# Patient Record
Sex: MALE | Race: WHITE | NOT HISPANIC OR LATINO | Employment: UNEMPLOYED | URBAN - METROPOLITAN AREA
[De-identification: names, ages, dates, MRNs, and addresses within clinical notes are randomized per-mention and may not be internally consistent; named-entity substitution may affect disease eponyms.]

---

## 2022-08-29 ENCOUNTER — HOSPITAL ENCOUNTER (EMERGENCY)
Facility: CLINIC | Age: 62
Discharge: HOME | End: 2022-08-29
Attending: EMERGENCY MEDICINE | Admitting: EMERGENCY MEDICINE
Payer: MEDICARE

## 2022-08-29 VITALS
OXYGEN SATURATION: 96 % | SYSTOLIC BLOOD PRESSURE: 115 MMHG | TEMPERATURE: 98.3 F | WEIGHT: 183 LBS | BODY MASS INDEX: 26.2 KG/M2 | RESPIRATION RATE: 16 BRPM | DIASTOLIC BLOOD PRESSURE: 74 MMHG | HEART RATE: 76 BPM | HEIGHT: 70 IN

## 2022-08-29 DIAGNOSIS — R44.3 HALLUCINATIONS: ICD-10-CM

## 2022-08-29 DIAGNOSIS — Z91.148 NONCOMPLIANCE WITH MEDICATION REGIMEN: ICD-10-CM

## 2022-08-29 DIAGNOSIS — F22 DELUSION (H): ICD-10-CM

## 2022-08-29 LAB — SARS-COV-2 RNA RESP QL NAA+PROBE: NEGATIVE

## 2022-08-29 PROCEDURE — U0005 INFEC AGEN DETEC AMPLI PROBE: HCPCS | Performed by: EMERGENCY MEDICINE

## 2022-08-29 PROCEDURE — 99203 OFFICE O/P NEW LOW 30 MIN: CPT | Performed by: PSYCHIATRY & NEUROLOGY

## 2022-08-29 PROCEDURE — 99285 EMERGENCY DEPT VISIT HI MDM: CPT | Mod: CS,25

## 2022-08-29 PROCEDURE — 90791 PSYCH DIAGNOSTIC EVALUATION: CPT

## 2022-08-29 PROCEDURE — C9803 HOPD COVID-19 SPEC COLLECT: HCPCS

## 2022-08-29 PROCEDURE — 250N000013 HC RX MED GY IP 250 OP 250 PS 637: Performed by: EMERGENCY MEDICINE

## 2022-08-29 RX ORDER — OLANZAPINE 5 MG/1
5 TABLET, ORALLY DISINTEGRATING ORAL ONCE
Status: COMPLETED | OUTPATIENT
Start: 2022-08-29 | End: 2022-08-29

## 2022-08-29 RX ADMIN — OLANZAPINE 5 MG: 5 TABLET, ORALLY DISINTEGRATING ORAL at 08:27

## 2022-08-29 ASSESSMENT — ENCOUNTER SYMPTOMS
APPETITE CHANGE: 1
HALLUCINATIONS: 1
SLEEP DISTURBANCE: 1

## 2022-08-29 ASSESSMENT — ACTIVITIES OF DAILY LIVING (ADL)
ADLS_ACUITY_SCORE: 35

## 2022-08-29 NOTE — DISCHARGE INSTRUCTIONS
Aftercare Plan    Follow up with established providers and supports as scheduled. Continue taking medications as prescribed. Abstain from drugs and alcohol. Utilize your [facility] mental health crisis team as needed. They are available 24/7. Contact information is listed below.       If I am feeling unsafe or I am in a crisis, I will:   Contact my established care providers   Call the [place] Suicide Prevention Lifeline: [phone]   Go to the nearest emergency room   Call 911     Warning signs that I or other people might notice when a crisis is developing for me: changes to sleep, appetite or mood, increased anger, agitation or irritability, feeling depressed or hopeless, spending more time alone or talking less, increased crying, decreased productivity, seeing or hearing things that aren't there, thoughts of not wanting to live anymore or of actually killing myself, thoughts of hurting others    Things I am able to do on my own to cope or help me feel better: watching a favorite tv show or movie, listening to music I enjoy, going outside and breathing fresh air, going for a walk or exercising, taking a shower or bath, a cold or hot beverage, a healthy snack, drawing/coloring/painting, journaling, singing or dancing, deep breathing     I can try practicing square breathing when I begin to feel anxious - inhale through the nose for the count of 4 and the first line on the square. Exhale through the mouth for the count of 4 for the second line of the square. Repeat to complete the square. Repeat the square as many times as needed.    I can also use my five senses to practice mindfulness and grounding. What are five things I can see, four things I can hear, three things I can feel, two things I can smell, and one thing I can taste.     Things that I am able to do with others to cope or help me feel better: sometimes just talking or spending time with someone else, sharing a meal or having coffee, watching a movie or  "playing a game, going for a walk or exercising    I can also use community resources including mental health hotlines, [facility] crisis teams, or apps.     Things I can use or do for distraction: movies/tv, music, reading, games, drawing/coloring/painting or other art, essential oils, exercise, cleaning/organizing, puzzles, crossword puzzles, word search, Sudoku       I can also download a meditation or relaxation noah, like Calm, Headspace, or Insight Timer (all three offer a free version)    Changes I can make to support my mental health and wellness: Attend scheduled mental health therapy and psychiatric appointments. Take my medications as prescribed. Maintain a daily schedule/routine. Abstain from all mood altering substances, including drugs, alcohol, or medications not currently prescribed to me. Implement a self-care routine.      People in my life that I can ask for help: friends or family, trusted teachers/staff/colleagues, trusted members of my community or place of Christian, mental health crisis lines, or 911    Your [facility] has a mental health crisis team you can call 24/7: [facility], [phone]    Other things that are important when I m in crisis: to remember that the feelings I am having right now are temporary, and it won't feel like this forever, and that it is okay and important to ask for help    Crisis Lines  Crisis Text Line  Text 801978  You will be connected with a trained live crisis counselor to provide support.    Por espanol, texto  JAY a 447397 o texto a 442-AYUDAME en WhatsApp    [place] Hope Line  1.800.SUICIDE [3848979]      Community Resources  Fast Tracker  Linking people to mental health and substance use disorder resources  fasttrackermn.org     [place] Mental Health Warm Line  Peer to peer support  Monday thru Saturday, 12 pm to 10 pm  035.754.3787 or 9.423.208.2667  Text \"Support\" to 10082    [employer] (ROSALIA)  870.658.0819 or " 1.888.ROSALIA.HELPS      Mental Health Apps  My3  https://myUkashpp.org/    VirtualHopeBox  https://H3 PolÃ[employer]meros/apps/virtual-hope-box/      Additional Information  Today you were seen by a licensed mental health professional through Triage and Transition services, [facility])  for a crisis assessment in the Emergency Department at [facility].  It is recommended that you follow up with your established providers (psychiatrist, mental health therapist, and/or primary care doctor - as relevant) as soon as possible. Coordinators from [facility] will be calling you in the next 24-48 hours to ensure that you have the resources you need.  You can also contact [facility] coordinators directly at [phone]. You may have been scheduled for or offered an appointment with a mental health provider. [facility] maintains an extensive network of licensed behavioral health providers to connect patients with the services they need.  We do not charge providers a fee to participate in our referral network.  We match patients with providers based on a patient's specific needs, insurance coverage, and location.  Our first effort will be to refer you to a provider within your care system, and will utilize providers outside your care system as needed.

## 2022-08-29 NOTE — PLAN OF CARE
Admission Note:     Writer met with the patient for admission interview. Patient affect calm. Pt denies suicidal ideation. Pt forthcoming with reported delusions. Pt states concerned there are people wanting to get into his home. Pt reported contacted the police. Pt states police brought him to hospital due to mental illness. Pt denies having a mental illness. Pt reported is not taking any medications for mental illness. Pt denies alcohol and drug use. Patient changed into scrubs, belongings placed in the locker. Pt acceptance of transfer to [facility].

## 2022-08-29 NOTE — ED PROVIDER NOTES
"  History   Chief Complaint:  Mental Health Problem       The history is provided by the patient and the police.      Joel Kruse is a 62 year old male with history of HIV (undetectable viral load May 2022) and depression who presents with hallucinations. Per police report, the patient has been calling PD due to hearing voices in his walls. He believes people are breaking into his living space and bugging the walls. He is not taking any medications because he did not feel those prescribed by a psychiatrist about a year ago (Seroquel and mirtazapine) were necessary or helpful.     Joel reports he is being harassed by his neighbors and has to keep calling the police as a result. \"Herman\" is who is orchestrating the people that are monitoring him and trying to kill him. Today he saw shadows under his door consistent with someone breaking in to his house so he called PD. There was no one in his house when they arrived so he thinks they left out the sliding glass door. He has poor appetite and severe insomnia. He denies drug use. He denies suicidal ideation or homicidal ideation.    Review of Systems   Constitutional: Positive for appetite change.   Psychiatric/Behavioral: Positive for hallucinations and sleep disturbance. Negative for suicidal ideas.         NEG - homicidal ideation   All other systems reviewed and are negative.    Allergies:  No Known Allergies    Medications:  Aspirin 81 mg   Biktarvy   Cialis     Past Medical History:     ADHD   Hyperlipidemia   Peripheral neuropathy   Depression   UTI  Tobacco use disorder   HIV     Past Surgical History:    Colonoscopy (x2)  Eye surgery     Social History:  The patient presents to the ED alone.   Lives alone.  Does not smoke, use alcohol, or use drugs.   Does not work due to disability (HIV).     Physical Exam     Patient Vitals for the past 24 hrs:   BP Temp Temp src Pulse Resp SpO2 Height Weight   08/29/22 0830 115/74 98.3  F (36.8  C) Oral 76 -- 96 % " "1.778 m (5' 10\") 83 kg (183 lb)   08/29/22 0824 (!) 157/103 -- -- 114 -- 97 % -- --   08/29/22 0703 (!) 151/97 98.7  F (37.1  C) Oral 115 16 95 % -- --     Physical Exam   General: WD/WN; well appearing middle aged  man; cooperative  Head:  Atraumatic  Eyes:  Conjunctivae, lids, and sclerae are normal  Neck:  Supple; normal ROM  Resp:  No respiratory distress  GI:  Nondistended    MS:  Normal ROM  Skin:  Warm; non-diaphoretic; no pallor  Neuro: Awake; A&Ox3  Psych:  Anxious mood and affect; normal speech; not responding to internal stimuli. Delusional thought content without suicidal thought content  Vitals reviewed.    Emergency Department Course     Laboratory:  Labs Ordered and Resulted from Time of ED Arrival to Time of ED Departure   COVID-19 VIRUS (CORONAVIRUS) BY PCR - Normal       Result Value    SARS CoV2 PCR Negative          Emergency Department Course:  Reviewed:  I reviewed nursing notes, vitals, past medical history, and Care Everywhere.    Assessments:  ED Course as of 08/29/22 0848   Mon Aug 29, 2022   0740 I obtained history and examined the patient.      Disposition:  The patient was transferred to [facility].     Impression & Plan   Medical Decision Making:  Joel is a 62 year old man who called police several times today because he was certain people were trying to break into his apartment and had been bugging his walls and watching him.  He tells me a man named \"Herman\" is orchestrating people to monitor him and try to kill him for quite some time.  He describes anorexia and insomnia.  He denies drug use.  He reports about a year ago he was prescribed psychotropics but he did not take these because he did not feel they were necessary and does not have any mental health providers.  On exam he appears well, anxious, with no suicidal thought content.  However, he clearly has delusional thought content and probable hallucinations although he is not responding to internal stimuli during " interview.  I have concern for schizophrenia versus bipolar suzanne with psychosis.  After his COVID-19 test was negative he was transferred to the [facility] unit for a thorough psychiatric evaluation and treatment plan.  Joel understands this plan and all of his questions were answered.    Covid-19  Joel Kruse was evaluated during a global COVID-19 pandemic, which necessitated consideration that the patient might be at risk for infection with the SARS-CoV-2 virus that causes COVID-19.   Applicable protocols for evaluation were followed during the patient's care.   COVID-19 was considered as part of the patient's evaluation. The plan for testing is:  a test was obtained during this visit.    Diagnosis:    ICD-10-CM    1. Delusion (H)  F22    2. Hallucinations  R44.3    3. Noncompliance with medication regimen  Z91.14        Scribe Disclosure:  GIN SULTANA, am serving as a scribe at 7:01 AM on 8/29/2022 to document services personally performed by Dr. Nita Fields MD, based on my observations and the provider's statements to me         Nita Fields MD  09/04/22 2276

## 2022-08-29 NOTE — ED TRIAGE NOTES
Pt arrives by EMS. PD were called by Joel 3 separate times for him reporting people breaking into his house. Joel has not been sleeping and believes that his house is being bugged/recorded by unknown people. Joel admits to not talking his mental health medications.       Triage Assessment     Row Name 08/29/22 0646       Triage Assessment (Adult)    Airway WDL WDL       Respiratory WDL    Respiratory WDL WDL       Skin Circulation/Temperature WDL    Skin Circulation/Temperature WDL WDL       Cardiac WDL    Cardiac WDL WDL       Peripheral/Neurovascular WDL    Peripheral Neurovascular WDL WDL       Cognitive/Neuro/Behavioral WDL    Cognitive/Neuro/Behavioral WDL X;mood/behavior       [place] Coma Scale    Best Eye Response 4-->(E4) spontaneous    Best Motor Response 6-->(M6) obeys commands    Best Verbal Response 5-->(V5) oriented    [place] Coma Scale Score 15
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
## 2022-08-29 NOTE — ED PROVIDER NOTES
EmPATH Unit - Psychiatry  Combined Observation Note and Discharge Summary  [facility] Emergency Department  Observation Initiation Date: Aug 29, 2022    Joel Kruse MRN: [id number]   Age: 62 year old YOB: 1960     History     Chief Complaint   Patient presents with     Mental Health Problem     HPI  Joel Kruse is a 62 year old male with a past history notable for methamphetamine use which has been in remission for some time.  He presented to the emergency department for evaluation of paranoia and hallucinations.  He was determined to be medically stable and transferred to the EmPATH unit for psychiatric assessment.  Since his arrival to the unit, there have been no acute issues.  On examination, the patient was noted to be sleeping comfortably in the recliner.  He awoke easily and accompany me to the interview room.  He reports that his anxiety has subsided since his arrival to the unit.  He has not been experiencing paranoia or hallucinations since his arrival.  He explains that increased psychosocial stressors related to an [place] back home to [place], further compounded with a few nights of insomnia, may have contributed to the symptoms which preceded his arrival.  He has been sleeping well on the unit and attributes this as having a therapeutic effect.  He recalls a history of taking Seroquel and mirtazapine for insomnia however discontinued these medications sometime ago.  He denied symptoms of a depressive episode.  He denied symptoms of suzanne.  He denied active psychosis.  He denied suicidal and homicidal thoughts.  He did not report recent illicit substance usage.  He interprets readiness to discharge home today.      Past Medical History  Past Medical History:   Diagnosis Date     Asymptomatic human immunodeficiency virus (HIV) infection status (H)      Coma (H) 2012    Pt unsure why     Gastro-oesophageal reflux disease   "    Past Surgical History:   Procedure Laterality Date     COLONOSCOPY N/A 10/23/2014    Procedure: COMBINED COLONOSCOPY, SINGLE OR MULTIPLE BIOPSY/POLYPECTOMY BY BIOPSY;  Surgeon: Hamilton Horton MD;  Location:  GI     COLONOSCOPY N/A 11/13/2015    Procedure: COMBINED COLONOSCOPY, SINGLE OR MULTIPLE BIOPSY/POLYPECTOMY BY BIOPSY;  Surgeon: Hamilton Horton MD;  Location:  GI     EYE SURGERY       Abacavir Sulfate-Lamivudine (EPZICOM PO)  AMIODARONE HCL PO  Atorvastatin Calcium (LIPITOR PO)  Esomeprazole Magnesium (NEXIUM PO)  GABAPENTIN PO  NALTREXONE HCL PO  raltegravir (ISENTRESS) 400 MG tablet  Varenicline Tartrate (CHANTIX PO)      No Known Allergies  Family History  No family history on file.  Social History   Social History     Tobacco Use     Smoking status: Former Smoker     Packs/day: 0.25     Years: 3.00     Pack years: 0.75   Substance Use Topics     Alcohol use: No     Comment: Quit in 2012     Drug use: No     Comment: Quit in 2012      Past medical history, past surgical history, medications, allergies, family history, and social history were reviewed with the patient. No additional pertinent items.       Review of Systems  A complete review of systems was performed with pertinent positives and negatives noted in the HPI, and all other systems negative.    Physical Examination   BP: (!) 151/97  Pulse: 115  Temp: 98.7  F (37.1  C)  Resp: 16  Height: 177.8 cm (5' 10\")  Weight: 83 kg (183 lb)  SpO2: 95 %    Physical Exam  General: Appears stated age.   Neuro: Alert and fully oriented. Extremities appear to demonstrate normal strength on visual inspection.   Integumentary/Skin: no rash visualized, normal color    Psychiatric Examination   Appearance: awake, alert  Attitude:  cooperative  Eye Contact:  fair  Mood:  better  Affect:  appropriate and in normal range  Speech:  clear, coherent  Psychomotor Behavior:  no evidence of tardive dyskinesia, dystonia, or tics  Thought Process:  logical and " linear  Associations:  no loose associations  Thought Content:  no evidence of suicidal ideation or homicidal ideation and no evidence of psychotic thought  Insight:  fair  Judgement:  intact  Oriented to:  time, person, and place  Attention Span and Concentration:  fair  Recent and Remote Memory:  fair  Language: able to name/identify objects without impairment  Fund of Knowledge: intact with awareness of current and past events    ED Course     ED Course as of 08/29/22 1244   Mon Aug 29, 2022   0740 I obtained history and examined the patient.        Labs Ordered and Resulted from Time of ED Arrival to Time of ED Departure   COVID-19 VIRUS (CORONAVIRUS) BY PCR - Normal       Result Value    SARS CoV2 PCR Negative         Assessments & Plan (with Medical Decision Making)   Patient presenting with concern for paranoid ideations and associated hallucinations. Nursing notes reviewed noting no acute issues.  Symptoms have subsided since his arrival to the unit.  The patient reports regaining baseline functioning and interprets readiness to discharge home.    I have reviewed the assessment completed by the [facility].     During the observation period, the patient did not require medications for agitation, and did not require restraints/seclusion for patient and/or provider safety.    The patient was found to have a psychiatric condition that would benefit from an observation stay in the emergency department for further psychiatric stabilization and/or coordination of a safe disposition. The observation plan includes serial assessments of psychiatric condition, potential administration of medications if indicated, further disposition pending the patient's psychiatric course during the monitoring period.     Preliminary diagnosis:    ICD-10-CM    1. Delusion (H)  F22    2. Hallucinations  R44.3         Treatment Plan:  -Urine toxicology screen has been ordered to rule out the possibility of illicit substances contributing to  his presentation.  -At this time, there does not appear to be any indication to initiate psychotropic medications.  The patient is not seeking any medications for insomnia or other mental health related concerns.  -Resume routine outpatient visits with his primary care physician or sooner if indicated  -Discharge home today.    After the period in observation care, the patient's circumstances and mental state were safe for outpatient management. After counseling on the diagnosis, work-up, and treatment plan, the patient was discharged. Close follow-up with a psychiatrist and/or therapist was recommended and community psychiatric resources were provided. Patient is to return to the ED if any urgent or potentially life-threatening concerns.      At the time of discharge, the patient's acute suicide risk was determined to be low due to the following factors: Reduction in the intensity of mood/anxiety symptoms that preceded the admission, denial of suicidal thoughts, denies feeling helpless or helpless, not currently under the influence of alcohol or illicit substances, denies experiencing command hallucinations, no immediate access to firearms. The patient's acute risk could be higher if noncompliant with their treatment plan, medications, follow-up appointments or using illicit substances or alcohol. Protective factors include: social supports, stable housing, employment    --  Alan Nunez MD   [facility] EMERGENCY DEPT  EmPATH Unit  8/29/2022         Alan Nunez MD  08/29/22 4800

## 2022-08-29 NOTE — PLAN OF CARE
Discharge instructions reviewed with patient including follow-up care plan. Medications:NONE. Reviewed safety plan and outpatient resources. Denies SI and HI. All belongings that were brought into the hospital have been returned to patient. Escorted off the unit at 13:25 accompanied by [facility] staff. Discharged to Home via Bus.

## 2022-08-29 NOTE — ED NOTES
Bed: ED17  Expected date: 8/29/22  Expected time: 6:35 AM  Means of arrival: Ambulance  Comments:  Shonna 511 62M hallucinating; off meds

## 2022-09-07 ENCOUNTER — APPOINTMENT (OUTPATIENT)
Dept: GENERAL RADIOLOGY | Facility: CLINIC | Age: 62
End: 2022-09-07
Attending: EMERGENCY MEDICINE
Payer: MEDICARE

## 2022-09-07 ENCOUNTER — APPOINTMENT (OUTPATIENT)
Dept: CT IMAGING | Facility: CLINIC | Age: 62
End: 2022-09-07
Attending: EMERGENCY MEDICINE
Payer: MEDICARE

## 2022-09-07 ENCOUNTER — HOSPITAL ENCOUNTER (EMERGENCY)
Facility: CLINIC | Age: 62
Discharge: HOME | End: 2022-09-07
Attending: EMERGENCY MEDICINE | Admitting: EMERGENCY MEDICINE
Payer: MEDICARE

## 2022-09-07 VITALS
WEIGHT: 185 LBS | BODY MASS INDEX: 25.06 KG/M2 | OXYGEN SATURATION: 99 % | HEIGHT: 72 IN | DIASTOLIC BLOOD PRESSURE: 82 MMHG | SYSTOLIC BLOOD PRESSURE: 115 MMHG | HEART RATE: 76 BPM | TEMPERATURE: 97.2 F | RESPIRATION RATE: 14 BRPM

## 2022-09-07 DIAGNOSIS — R06.02 SHORTNESS OF BREATH: ICD-10-CM

## 2022-09-07 LAB
ALBUMIN SERPL-MCNC: 3.8 G/DL (ref 3.4–5)
ALP SERPL-CCNC: 92 U/L (ref 40–150)
ALT SERPL W P-5'-P-CCNC: 25 U/L (ref 0–70)
ANION GAP SERPL CALCULATED.3IONS-SCNC: 4 MMOL/L (ref 3–14)
AST SERPL W P-5'-P-CCNC: 18 U/L (ref 0–45)
BASOPHILS # BLD AUTO: 0.1 10E3/UL (ref 0–0.2)
BASOPHILS NFR BLD AUTO: 1 %
BILIRUB SERPL-MCNC: 0.3 MG/DL (ref 0.2–1.3)
BUN SERPL-MCNC: 18 MG/DL (ref 7–30)
CALCIUM SERPL-MCNC: 9.4 MG/DL (ref 8.5–10.1)
CHLORIDE BLD-SCNC: 102 MMOL/L (ref 94–109)
CO2 SERPL-SCNC: 30 MMOL/L (ref 20–32)
CREAT SERPL-MCNC: 1.46 MG/DL (ref 0.66–1.25)
D DIMER PPP FEU-MCNC: 1.26 UG/ML FEU (ref 0–0.5)
EOSINOPHIL # BLD AUTO: 0.6 10E3/UL (ref 0–0.7)
EOSINOPHIL NFR BLD AUTO: 8 %
ERYTHROCYTE [DISTWIDTH] IN BLOOD BY AUTOMATED COUNT: 14.6 % (ref 10–15)
FLUAV RNA SPEC QL NAA+PROBE: NEGATIVE
FLUBV RNA RESP QL NAA+PROBE: NEGATIVE
GFR SERPL CREATININE-BSD FRML MDRD: 54 ML/MIN/1.73M2
GLUCOSE BLD-MCNC: 102 MG/DL (ref 70–99)
HCT VFR BLD AUTO: 44.7 % (ref 40–53)
HGB BLD-MCNC: 13.6 G/DL (ref 13.3–17.7)
HOLD SPECIMEN: NORMAL
IMM GRANULOCYTES # BLD: 0 10E3/UL
IMM GRANULOCYTES NFR BLD: 0 %
LYMPHOCYTES # BLD AUTO: 1.6 10E3/UL (ref 0.8–5.3)
LYMPHOCYTES NFR BLD AUTO: 19 %
MCH RBC QN AUTO: 25.1 PG (ref 26.5–33)
MCHC RBC AUTO-ENTMCNC: 30.4 G/DL (ref 31.5–36.5)
MCV RBC AUTO: 83 FL (ref 78–100)
MONOCYTES # BLD AUTO: 0.4 10E3/UL (ref 0–1.3)
MONOCYTES NFR BLD AUTO: 5 %
NEUTROPHILS # BLD AUTO: 5.6 10E3/UL (ref 1.6–8.3)
NEUTROPHILS NFR BLD AUTO: 67 %
NRBC # BLD AUTO: 0 10E3/UL
NRBC BLD AUTO-RTO: 0 /100
NT-PROBNP SERPL-MCNC: 27 PG/ML (ref 0–900)
PLATELET # BLD AUTO: 439 10E3/UL (ref 150–450)
POTASSIUM BLD-SCNC: 4.3 MMOL/L (ref 3.4–5.3)
PROT SERPL-MCNC: 9.2 G/DL (ref 6.8–8.8)
RBC # BLD AUTO: 5.41 10E6/UL (ref 4.4–5.9)
RSV RNA SPEC NAA+PROBE: NEGATIVE
SARS-COV-2 RNA RESP QL NAA+PROBE: NEGATIVE
SODIUM SERPL-SCNC: 136 MMOL/L (ref 133–144)
TROPONIN I SERPL HS-MCNC: 5 NG/L
WBC # BLD AUTO: 8.3 10E3/UL (ref 4–11)

## 2022-09-07 PROCEDURE — 85379 FIBRIN DEGRADATION QUANT: CPT | Performed by: EMERGENCY MEDICINE

## 2022-09-07 PROCEDURE — 80053 COMPREHEN METABOLIC PANEL: CPT | Performed by: EMERGENCY MEDICINE

## 2022-09-07 PROCEDURE — 36415 COLL VENOUS BLD VENIPUNCTURE: CPT | Performed by: EMERGENCY MEDICINE

## 2022-09-07 PROCEDURE — 93005 ELECTROCARDIOGRAM TRACING: CPT

## 2022-09-07 PROCEDURE — 87637 SARSCOV2&INF A&B&RSV AMP PRB: CPT | Performed by: EMERGENCY MEDICINE

## 2022-09-07 PROCEDURE — 85004 AUTOMATED DIFF WBC COUNT: CPT | Performed by: EMERGENCY MEDICINE

## 2022-09-07 PROCEDURE — 83880 ASSAY OF NATRIURETIC PEPTIDE: CPT | Performed by: EMERGENCY MEDICINE

## 2022-09-07 PROCEDURE — 84484 ASSAY OF TROPONIN QUANT: CPT | Performed by: EMERGENCY MEDICINE

## 2022-09-07 PROCEDURE — G1010 CDSM STANSON: HCPCS

## 2022-09-07 PROCEDURE — 99285 EMERGENCY DEPT VISIT HI MDM: CPT | Mod: 25

## 2022-09-07 PROCEDURE — C9803 HOPD COVID-19 SPEC COLLECT: HCPCS

## 2022-09-07 PROCEDURE — 250N000009 HC RX 250: Performed by: EMERGENCY MEDICINE

## 2022-09-07 PROCEDURE — 82040 ASSAY OF SERUM ALBUMIN: CPT | Performed by: EMERGENCY MEDICINE

## 2022-09-07 PROCEDURE — 250N000011 HC RX IP 250 OP 636: Performed by: EMERGENCY MEDICINE

## 2022-09-07 PROCEDURE — 71046 X-RAY EXAM CHEST 2 VIEWS: CPT

## 2022-09-07 RX ORDER — IOPAMIDOL 755 MG/ML
69 INJECTION, SOLUTION INTRAVASCULAR ONCE
Status: COMPLETED | OUTPATIENT
Start: 2022-09-07 | End: 2022-09-07

## 2022-09-07 RX ADMIN — SODIUM CHLORIDE 93 ML: 9 INJECTION, SOLUTION INTRAVENOUS at 18:52

## 2022-09-07 RX ADMIN — IOPAMIDOL 69 ML: 755 INJECTION, SOLUTION INTRAVENOUS at 18:51

## 2022-09-07 ASSESSMENT — ACTIVITIES OF DAILY LIVING (ADL)
ADLS_ACUITY_SCORE: 35
ADLS_ACUITY_SCORE: 35

## 2022-09-07 ASSESSMENT — ENCOUNTER SYMPTOMS
DIAPHORESIS: 1
SHORTNESS OF BREATH: 1
COUGH: 1
LIGHT-HEADEDNESS: 1

## 2022-09-07 NOTE — ED NOTES
Rapid Assessment Note    History:   Joel Kruse is a 62 year old male who presents with lightheadedness. He has had a dry cough for the past couple of weeks, but it worsened this morning. Then later in the day, he was walking over to the [employer] while it was hot outside. Just before he walked into the mall, he suddenly became diaphoretic, lightheaded, and short of breath. He does not normally get short of breath easily. His symptoms prompted him to sit down. He continued to walk throughout the mall, and had another episode of symptoms once he reached the other side. This prompted him to call 911, and he was initially assessed by EMS. He continues to have diaphoresis since then. He does not have a history of heart disease, blood clots, or cancers. His vaccinated for Covid-19, and has not had it recently. He is not aware of any sick contacts, but had a flight back from [facility] a couple weeks ago. He is not a smoker. He denies chest pain or leg swelling/pain.     Exam:   General:  Alert, interactive  Cardiovascular:  Well perfused  Lungs:  No respiratory distress, no accessory muscle use  Neuro:  Moving all 4 extremities  Skin:  Warm, dry  Psych:  Normal affect    Plan of Care:   I evaluated the patient and developed an initial plan of care. I discussed this plan and explained that I, or one of my partners, would be returning to complete the evaluation.     I, Carter Murdock, am serving as a scribe to document services personally performed by Zeina Drummond MD, based on my observations and the provider's statements to me. I will order labs and imaging given his symptoms.     09/07/2022  [facility]    Portions of this medical record were completed by a scribe. UPON MY REVIEW AND AUTHENTICATION BY ELECTRONIC SIGNATURE, this confirms (a) I performed the applicable clinical services, and (b) the record is accurate.

## 2022-09-08 LAB
ATRIAL RATE - MUSE: 76 BPM
DIASTOLIC BLOOD PRESSURE - MUSE: NORMAL MMHG
INTERPRETATION ECG - MUSE: NORMAL
P AXIS - MUSE: 52 DEGREES
PR INTERVAL - MUSE: 204 MS
QRS DURATION - MUSE: 92 MS
QT - MUSE: 420 MS
QTC - MUSE: 472 MS
R AXIS - MUSE: 20 DEGREES
SYSTOLIC BLOOD PRESSURE - MUSE: NORMAL MMHG
T AXIS - MUSE: 75 DEGREES
VENTRICULAR RATE- MUSE: 76 BPM

## 2022-09-08 NOTE — ED PROVIDER NOTES
History   Chief Complaint:  Shortness of Breath        The history is provided by the patient.      Joel Kruse is a 62 year old male with history of AIDS and hyperlipidemia who presents with shortness of breath. He has had a dry cough for the past couple of weeks, but it worsened this morning. Then later in the day, he was walking over to the [employer] while it was hot outside. Just before he walked into the mall, he suddenly became diaphoretic, lightheaded, and short of breath. He does not normally get short of breath easily. His symptoms prompted him to sit down. He continued to walk throughout the mall, and had another episode of symptoms once he reached the other side. This prompted him to call 911, and he was assessed by EMS. He was then brought to the ED. He continues to have diaphoresis since then. He does not have a history of heart disease, blood clots, or cancers. His vaccinated for Covid-19, and has not had it recently. He is not aware of any sick contacts, but had a flight back from [facility] a couple weeks ago. He is not a smoker. He denies chest pain or leg swelling/pain.     Review of Systems   Constitutional: Positive for diaphoresis.   Respiratory: Positive for cough (Nonproductive) and shortness of breath.    Cardiovascular: Negative for chest pain and leg swelling.   Neurological: Positive for light-headedness.   All other systems reviewed and are negative.    Allergies:  No Known Allergies    Medications:  Epzicom   Amiodarone   Lipitor   Nexium   Gabapentin   Naltrexone   Raltegravir  Chantix     Past Medical History:     AIDS   Infectious gastroenteritis   ADHD   Polysubstance dependence   HLD   Peripheral neuropathy   Major depression    Tobacco use disorder   LAUREEN   Viral hepatitis B     Past Surgical History:    Colon biopsy/polypectomy   Unspecified eye surgery     Family History:    The patient denies past family history.     Social History:  The patient presents to the  ED alone via private vehicle   PCP: Abhay Salomon   Hx of tobacco use and polysubstance abuse     Physical Exam     Patient Vitals for the past 24 hrs:   BP Temp Temp src Pulse Resp SpO2 Height Weight   09/07/22 2000 115/82 -- -- 76 14 99 % -- --   09/07/22 1930 128/80 -- -- 70 19 98 % -- --   09/07/22 1917 -- -- -- 85 13 99 % -- --   09/07/22 1914 119/81 -- -- 80 -- -- -- --   09/07/22 1737 113/83 97.2  F (36.2  C) Temporal 94 18 99 % 1.829 m (6') 83.9 kg (185 lb)       Physical Exam  General: Resting on the gurney, appears uncomfortable.   Head:  The scalp, face, and head appear normal  Mouth/Throat: Mucus membranes are moist  CV:  Regular rate    Normal S1 and S2  No pathological murmur   Resp:  Breath sounds clear and equal bilaterally    Non-labored, no retractions or accessory muscle use    No coarseness    Frequent cough. No wheezing. No coarseness. Normal breath sounds throughout.   GI:  Abdomen is soft, no rigidity    No tenderness to palpation  MS:  Normal motor assessment of all extremities.    Good capillary refill noted.  Skin:  No rash or lesions noted.  Neuro: Speech is normal and fluent. No apparent deficit.  Psych:  Awake. Alert.     Appropriate interactions.      Emergency Department Course   ECG:  ECG taken at 1833, ECG read at 1835  Normal sinus rhythm   Normal ECG   Rate 76 bpm. WY interval 204 ms. QRS duration 92 ms. QT/QTc 420/472 ms. P-R-T axes 52 20 75.     Imaging:  CT Chest Pulmonary Embolism w Contrast  Final Result  IMPRESSION:  1.  No evidence for pulmonary embolism.     XR Chest 2 Views  Final Result  IMPRESSION: Negative chest.    Report per radiology    Laboratory:  Labs Ordered and Resulted from Time of ED Arrival to Time of ED Departure   D DIMER QUANTITATIVE - Abnormal       Result Value    D-Dimer Quantitative 1.26 (*)    COMPREHENSIVE METABOLIC PANEL - Abnormal    Sodium 136      Potassium 4.3      Chloride 102      Carbon Dioxide (CO2) 30      Anion Gap 4      Urea Nitrogen 18       Creatinine 1.46 (*)     Calcium 9.4      Glucose 102 (*)     Alkaline Phosphatase 92      AST 18      ALT 25      Protein Total 9.2 (*)     Albumin 3.8      Bilirubin Total 0.3      GFR Estimate 54 (*)    CBC WITH PLATELETS AND DIFFERENTIAL - Abnormal    WBC Count 8.3      RBC Count 5.41      Hemoglobin 13.6      Hematocrit 44.7      MCV 83      MCH 25.1 (*)     MCHC 30.4 (*)     RDW 14.6      Platelet Count 439      % Neutrophils 67      % Lymphocytes 19      % Monocytes 5      % Eosinophils 8      % Basophils 1      % Immature Granulocytes 0      NRBCs per 100 WBC 0      Absolute Neutrophils 5.6      Absolute Lymphocytes 1.6      Absolute Monocytes 0.4      Absolute Eosinophils 0.6      Absolute Basophils 0.1      Absolute Immature Granulocytes 0.0      Absolute NRBCs 0.0     TROPONIN I - Normal    Troponin I High Sensitivity 5     NT PROBNP INPATIENT - Normal    N terminal Pro BNP Inpatient 27     INFLUENZA A/B & SARS-COV2 PCR MULTIPLEX - Normal    Influenza A PCR Negative      Influenza B PCR Negative      RSV PCR Negative      SARS CoV2 PCR Negative        Emergency Department Course:    Reviewed:  I reviewed nursing notes, vitals and past medical history    Assessments:   I obtained history and examined the patient as noted above.   2029 I rechecked the patient and explained findings. I discussed plan for discharge home.    Disposition:  The patient was discharged to home.     Impression & Plan   Medical Decision Making:  Joel Kruse is a 62 year old male who presents with shortness of breath.  Extensive work-up was undertaken.  No evidence of cardiac ischemia.  He does have risk factors for blood clot.  A D-dimer was obtained and was positive.  CT was unremarkable.  No evidence of blood clot, pneumonia, ARDS, or fluid overload.  He was improved on repeat evaluation and eager for discharge home.  He has not had chest pain and had frequent cough.  He states that he is actually had this symptom for quite  a while and thinks that he had just overdone it walking to the mall.  I believe cardiac etiology is unlikely.  He is discharged per his request.    Diagnosis:    ICD-10-CM    1. Shortness of breath  R06.02        Scribe Disclosure:  I, Carter Mathieu, am serving as a scribe at 7:36 PM on 9/7/2022 to document services personally performed by Zeina Drummond MD based on my observations and the provider's statements to me.        Zeina Drummond MD  09/08/22 0103

## 2024-06-16 ENCOUNTER — HOSPITAL ENCOUNTER (EMERGENCY)
Facility: CLINIC | Age: 64
Discharge: HOME | End: 2024-06-16
Attending: EMERGENCY MEDICINE | Admitting: EMERGENCY MEDICINE
Payer: COMMERCIAL

## 2024-06-16 VITALS
BODY MASS INDEX: 23.7 KG/M2 | HEART RATE: 88 BPM | RESPIRATION RATE: 14 BRPM | WEIGHT: 175 LBS | HEIGHT: 72 IN | DIASTOLIC BLOOD PRESSURE: 77 MMHG | SYSTOLIC BLOOD PRESSURE: 110 MMHG | TEMPERATURE: 97.4 F | OXYGEN SATURATION: 96 %

## 2024-06-16 DIAGNOSIS — F32.A DEPRESSION, UNSPECIFIED DEPRESSION TYPE: ICD-10-CM

## 2024-06-16 PROCEDURE — 99283 EMERGENCY DEPT VISIT LOW MDM: CPT

## 2024-06-16 ASSESSMENT — ACTIVITIES OF DAILY LIVING (ADL)
ADLS_ACUITY_SCORE: 35
ADLS_ACUITY_SCORE: 35

## 2024-06-16 ASSESSMENT — COLUMBIA-SUICIDE SEVERITY RATING SCALE - C-SSRS
6. HAVE YOU EVER DONE ANYTHING, STARTED TO DO ANYTHING, OR PREPARED TO DO ANYTHING TO END YOUR LIFE?: NO
2. HAVE YOU ACTUALLY HAD ANY THOUGHTS OF KILLING YOURSELF IN THE PAST MONTH?: NO
1. IN THE PAST MONTH, HAVE YOU WISHED YOU WERE DEAD OR WISHED YOU COULD GO TO SLEEP AND NOT WAKE UP?: NO

## 2024-06-16 NOTE — ED TRIAGE NOTES
EMS arrival:    Depressed for the past month.  Lost his job.  Problems at home.    Used meth yesterday.  Someone had offered him some so he tried it.

## 2024-06-16 NOTE — ED PROVIDER NOTES
Emergency Department Note      History of Present Illness     Chief Complaint  Depression    HPI  Joel Kruse is a 64 year old male with a history of hyperlipidemia who presents to the ER for depression. Patient reports buying a house with his former partner and when they broke up, they made an agreement that when his partner sold the house he would pay Joel what he put into the house. Recently, he found that his partner sold at an increase and refuses to honor the agreement. He notes that his partner lied to his familly about him being an alcoholic and they were the only family he ever had. Patient recalls being in school before COVID and receiving a call from a friend yesterday to hang out. The patient admits to smoking meth yesterday with this friend and has not slept for 24 hours, so he panicked and called EMS. The patient affirms that he is only depressed and not suicidal.     Independent Historian  None    Review of External Notes  None  Past Medical History   Medical History and Problem List  Asymptomatic human immunodeficiency virus  ADHD  AIDS  Coma  Depression  GERD  Hyperlipidemia   Onychomycosis   Peripheral neuropathy   Cervical radiculopathy  Allergic rhinitis   Tobacco use     Medications  Raltegravir   Biktarvy  Quetiapine   Tadalafil     Surgical History   Colonoscopy   Eye surgery    Physical Exam   Patient Vitals for the past 24 hrs:   BP Temp Temp src Pulse Resp SpO2 Height Weight   06/16/24 0321 110/77 97.4  F (36.3  C) Temporal 88 14 96 % 1.829 m (6') 79.4 kg (175 lb)     Physical Exam  Eyes:  The pupils are equal and round    Conjunctivae and sclerae are normal  ENT:    The nose is normal    Pinnae are normal  CV:  Regular rate and rhythm     No edema  Resp:  Lungs are clear    Non-labored    No rales    No wheezing   MS:  Normal muscular tone    No asymmetric leg swelling  Skin:  No rash or acute skin lesions noted  Neuro:   Awake, alert.      Speech is normal and fluent.    Face is  symmetric.     Moves all extremities  Psych: Normal affect.  Appropriate interactions. Endorses depression. Denies SI. Does not appear to be responding to internal stimuli.    Diagnostics   Lab Results   Labs Ordered and Resulted from Time of ED Arrival to Time of ED Departure - No data to display    Imaging  No orders to display     Independent Interpretation  None  ED Course    Medications Administered  Medications - No data to display    Discussion of Management  None    Social Determinants of Health adding to complexity of care  None    ED Course  ED Course as of 06/16/24 7870   Sun Jun 16, 2024   1009 I obtained the history and examined the patient as noted above.      Medical Decision Making / Diagnosis   CMS Diagnoses: None    MIPS     None    MDM  Joel Kruse is a 64 year old male who presents to the emergency department with concerns about depression.  He also is concerned about his recent methamphetamine use.  He used about 24 hours ago of methamphetamine from a friend of his.  He has not slept since that time.  Notes that he is now getting drowsy but he came in because of worries of depression over the past month.  He is also had a number of social stressors.  He denies being suicidal.  Initially he was agreeable to go to our [facility] unit for mental health resources.  He later realized that he has an appointment upcoming on Monday with a  who will assist him with finding outpatient therapist.  I think that is reasonable plan.  He does not seem to be altered from any methamphetamine and actually appears slightly drowsy.  Does not appear to be responding to internal stimuli and does not meet any hold criteria based on my evaluation.  He was discharged home and encouraged return with any new or worrisome symptoms.    Disposition  The patient was discharged.     ICD-10 Codes:    ICD-10-CM    1. Depression, unspecified depression type  F32.A                Scribe Disclosure:  I, Carlitos Gonzales,  am serving as a scribe  for Bryn Odonnell at 4:50 AM on 6/16/2024   I, Bryn Odonnell, am serving as a scribe at 4:50 AM on 6/16/2024 to document services personally performed by Bryan Lara MD based on my observations and the provider's statements to me.        Bryan Lara MD  06/16/24 0517

## 2024-06-16 NOTE — DISCHARGE INSTRUCTIONS
Discharge Instructions  Mental Health Concerns    You were seen today for mental health concerns, such as depression, anxiety, or suicidal thinking. Your provider feels that you do not require hospitalization at this time. However, your symptoms may become worse, and you may need to return to the Emergency Department. Most treatments of depression and suicidal thoughts are a process rather than a single intervention.  Medications and counseling can take several weeks or more to help.    Generally, every Emergency Department visit should have a follow-up clinic visit with either a primary or a specialty clinic/provider. Please follow-up as instructed by your emergency provider today.    By accepting these discharge instructions:  You promise to not harm yourself or others.  You agree that if you feel you are becoming unable to keep that promise, you will do something to help yourself before you do anything to harm yourself or others.   You agree to keep any safety plan arranged on your visit here today.  You agree to take any medication prescribed or recommended by your provider.  If you are getting worse, you can contact a friend or a family member, contact your counselor or family provider, contact a crisis line, or other options discussed with the provider or therapist today.  At any time, you can call 911 and return to the Emergency Department for more help.  You understand that follow-up is essential to your treatment, and you will make and keep appointments recommended on your visit today.    How to improve your mental health and prevent suicide:  Involve others by letting family, friends, counselors know.  Do not isolate yourself.  Avoid alcohol or drugs. Remove weapons, poisons from your home.  Try to stick to routines for eating, sleeping and getting regular exercise.    Try to get into sunlight. Bright natural light not only treats seasonal affective disorder but also depression.  Increase safe activities  that you enjoy.    If you feel worse, contact 1-800-suicide (1-486.463.4251), or call 911, or your primary provider/counselor for additional assistance.    If you were given a prescription for medicine here today, be sure to read all of the information (including the package insert) that comes with your prescription.  This will include important information about the medicine, its side effects, and any warnings that you need to know about.  The pharmacist who fills the prescription can provide more information and answer questions you may have about the medicine.  If you have questions or concerns that the pharmacist cannot address, please call or return to the Emergency Department.   Remember that you can always come back to the Emergency Department if you are not able to see your regular provider in the amount of time listed above, if you get any new symptoms, or if there is anything that worries you.

## 2024-06-16 NOTE — ED NOTES
Bed: ED16  Expected date:   Expected time:   Means of arrival:   Comments:  545 64M depression, smoked meth yesterday

## 2024-08-12 ENCOUNTER — HOSPITAL ENCOUNTER (EMERGENCY)
Facility: CLINIC | Age: 64
Discharge: HOME | End: 2024-08-13
Attending: SOCIAL WORKER | Admitting: SOCIAL WORKER
Payer: MEDICARE

## 2024-08-12 DIAGNOSIS — R44.0 AUDITORY HALLUCINATION: ICD-10-CM
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
## 2024-08-12 PROCEDURE — 99283 EMERGENCY DEPT VISIT LOW MDM: CPT

## 2024-08-12 ASSESSMENT — COLUMBIA-SUICIDE SEVERITY RATING SCALE - C-SSRS
1. IN THE PAST MONTH, HAVE YOU WISHED YOU WERE DEAD OR WISHED YOU COULD GO TO SLEEP AND NOT WAKE UP?: NO
6. HAVE YOU EVER DONE ANYTHING, STARTED TO DO ANYTHING, OR PREPARED TO DO ANYTHING TO END YOUR LIFE?: NO
2. HAVE YOU ACTUALLY HAD ANY THOUGHTS OF KILLING YOURSELF IN THE PAST MONTH?: NO

## 2024-08-12 ASSESSMENT — ACTIVITIES OF DAILY LIVING (ADL): ADLS_ACUITY_SCORE: 35

## 2024-08-13 VITALS
HEIGHT: 72 IN | OXYGEN SATURATION: 97 % | DIASTOLIC BLOOD PRESSURE: 69 MMHG | SYSTOLIC BLOOD PRESSURE: 127 MMHG | HEART RATE: 87 BPM | TEMPERATURE: 97.7 F | WEIGHT: 175 LBS | BODY MASS INDEX: 23.7 KG/M2 | RESPIRATION RATE: 12 BRPM

## 2024-08-13 LAB
FLUAV RNA SPEC QL NAA+PROBE: NEGATIVE
FLUBV RNA RESP QL NAA+PROBE: NEGATIVE
RSV RNA SPEC NAA+PROBE: NEGATIVE
SARS-COV-2 RNA RESP QL NAA+PROBE: NEGATIVE

## 2024-08-13 PROCEDURE — 87637 SARSCOV2&INF A&B&RSV AMP PRB: CPT | Mod: GZ | Performed by: SOCIAL WORKER

## 2024-08-13 ASSESSMENT — ACTIVITIES OF DAILY LIVING (ADL): ADLS_ACUITY_SCORE: 35

## 2024-08-13 NOTE — ED TRIAGE NOTES
Patient presents via EMS with reports that the  is trying to kill him with Bedbugs. Patient reports bits on sophia arms and lower legs. Patient is moving out at the end of the month and is hoping to move to [place]. Patient reports an employee at the apartment is trying to try kill him by selling him Fentanyl when he was trying to by meth from her. Patient denies current meth use.      Triage Assessment (Adult)       Row Name 08/12/24 4802          Triage Assessment    Airway WDL WDL     Additional Documentation Breath Sounds (Group)        Respiratory WDL    Respiratory WDL WDL        Skin Circulation/Temperature WDL    Skin Circulation/Temperature WDL WDL        Cardiac WDL    Cardiac WDL WDL        Peripheral/Neurovascular WDL    Peripheral Neurovascular WDL WDL        Cognitive/Neuro/Behavioral WDL    Cognitive/Neuro/Behavioral WDL WDL

## 2024-08-13 NOTE — ED PROVIDER NOTES
"  Emergency Department Note      History of Present Illness     Chief Complaint   Mental Health Problem (Patient presents via EMS with reports that the  is trying to kill him with Bedbugs. Patient reports bits on sophia arms and lower legs. Patient is moving out at the end of the month and is hoping to move to [place]. Patient reports an employee at the apartment is trying to try kill him by selling him Fentanyl when he was trying to by meth from her. Patient denies current meth use. )      HPI   Joel Kruse is a 64 year old male with a history of HIV reportedly undetectable viral load, CD4 count was within normal limits in April and he is adherent to his Biktarvy, who presents to the emergency department with a chief complaint of concern for allergic reaction to bed bugs and concerned that his  is trying to kill him, although reports \"I know that sounds super dramatic.  Patient reports that last week he was laying in bed when he started to feel bites on his legs, called [facility] department recommended that he call his . Reportedly manager has hired a bug sniffing dog to come to the apartment today. Patient states  got into an argument with the manager and yelled at her, was told that she was going to call the police however no police showed up. This morning went out to go shopping, when he came home, he started feeling like he was having allergic reaction with dry mouth, lightheadedness, shortness of breath thus called the ambulance.  He attributes this to the bedbugs.  Here in the emergency department his symptoms have all resolved.  No chest pain.  In regards to the rash, patient states that he has no pain, it is very severely itchy.  Denies any suicidal ideation or homicidal ideation, notes that he does have hallucinations however these been present for years.  He denies any substance use today.    Independent Historian   None    Review of External " Notes   I reviewed the ED visit from 6/16/2024:   I reviewed the admission    Past Medical History     Medical History and Problem List   Past Medical History:   Diagnosis Date    Asymptomatic human immunodeficiency virus (HIV) infection status (H)     Coma (H) 2012    Gastro-oesophageal reflux disease        Medications   Abacavir Sulfate-Lamivudine (EPZICOM PO)  AMIODARONE HCL PO  Atorvastatin Calcium (LIPITOR PO)  Esomeprazole Magnesium (NEXIUM PO)  GABAPENTIN PO  NALTREXONE HCL PO  raltegravir (ISENTRESS) 400 MG tablet  Varenicline Tartrate (CHANTIX PO)        Surgical History   Past Surgical History:   Procedure Laterality Date    COLONOSCOPY N/A 10/23/2014    Procedure: COMBINED COLONOSCOPY, SINGLE OR MULTIPLE BIOPSY/POLYPECTOMY BY BIOPSY;  Surgeon: Hamilton Horton MD;  Location:  GI    COLONOSCOPY N/A 11/13/2015    Procedure: COMBINED COLONOSCOPY, SINGLE OR MULTIPLE BIOPSY/POLYPECTOMY BY BIOPSY;  Surgeon: Hamilton Horton MD;  Location:  GI    EYE SURGERY         Physical Exam     Patient Vitals for the past 24 hrs:   BP Temp Temp src Pulse Resp SpO2 Height Weight   08/13/24 0030 127/69 -- -- 87 12 97 % -- --   08/13/24 0000 134/71 -- -- 89 10 97 % -- --   08/12/24 2300 (!) 146/86 -- -- 88 16 97 % -- --   08/12/24 2232 (!) 149/83 97.7  F (36.5  C) Temporal 91 17 -- 1.829 m (6') 79.4 kg (175 lb)     Physical Exam  General: Overall stable and nontoxic appearing  HEENT: Conjunctivae clear, no scleral icterus, mucous membranes moist, mild rhinorrhea present, full ROM of the neck  Neuro: Alert, moving all extremities equally with intention, walking easily   CV: Regular rate and rhythm, radial and DP pulses equal  Respiratory: No signs of respiratory distress, lungs clear to auscultation bilaterally   Abdomen: Soft, without rigidity or rebound throughout  MSK: Lateral lower extremities with lesions, no erythema or warmth, no crepitus              Diagnostics     Lab Results   Labs Ordered and Resulted  from Time of ED Arrival to Time of ED Departure - No data to display    Imaging   No orders to display     Independent Interpretation   None    ED Course      Medications Administered   Medications - No data to display    Procedures   Procedures     Discussion of Management   None    ED Course   ED Course as of 08/13/24 0159   Tue Aug 13, 2024   0113 Patient states that he would like to go home now.  He has not yet been seen by [facility], however I do not think that he meets any criteria for hold.  Encouraged him to follow-up with his primary care provider.  He states that he will do so, states that he is due for 4-month follow-up with his infectious disease provider and is going to call her soon for an appointment anyway as well.  I discussed the return precautions with him and he verbalized understanding.       Additional Documentation  None    Medical Decision Making / Diagnosis     CMS Diagnoses: None    MIPS       None    MDM   Joel Kruse is a 64 year old male with a history of HIV adherent to Biktarvy and CD4 count in April within normal limits, polysubstance use disorder, previous episode of psychosis, who presents to the ED with chief complaint of possible allergy to bedbug bites and concern that landlord is trying to harm him.  He denies any substance use for years although of note on ED visit from June does report methamphetamine use at that time.  On examination he is nontoxic and stable.  He is conversant, does not appear to be responding to internal stimuli.  Examination is notable for scattered bites on the legs that he states has no pain and is only itchy, quite possibly these are bug bug bites however I do not appreciate any overlying erythema warmth crepitus to suggest cellulitis or deeper skin infection.  His symptoms of possible allergic reaction with shortness of breath lightheadedness and dry mouth have all resolved here in the emergency department.  No signs of anaphylaxis.   With CD4 count in  normal limits and adherence to Biktarvy, doubt HIV associated central infection. No meningeal signs, no fever or headache to suggest encephalitis.     He does seem to have fixed belief of landlord trying to harm him however he denies any suicidal ideation or homicidal ideation and does not meet any criteria for hold. Reports chronic auditory hallucinations.  Patient originally with planning for assessment with [facility] however he ultimately decided to go home.  He is able to state to me that he will make an appointment with his primary care provider and indeed is planning to reach out to his infectious disease provider as he is due for his 4-month checkup.  Encouraged him to return to the emergency department if he develops chest pain shortness of breath again, fever, redness or swelling at the bites on his legs.  Verbalized understanding, discharged in stable condition.    Disposition   The patient was discharged.     Diagnosis     ICD-10-CM    1. Bug bite, initial encounter  W57.XXXA       2. Auditory hallucination  R44.0            Discharge Medications   New Prescriptions    No medications on file         MD Meet Avelar Connie, MD  08/13/24 5918

## 2024-08-13 NOTE — ED NOTES
Bed: ED11  Expected date:   Expected time:   Means of arrival:   Comments:  Shonna Hsu - Allergic reaction, bed bugs?

## 2024-08-13 NOTE — DISCHARGE INSTRUCTIONS
You were seen in the emergency department for concern for an allergic reaction from bedbugs.  Your exam and workup here was overall reassuring, we do not see signs of acute emergency at this time.  Because of the other presenting symptoms, we were discussing your speaking with the social workers here, however he ultimately would like to go home.    Please call your primary care doctor to schedule a follow-up appointment.  Please also follow-up with your infectious disease provider.  If your COVID test is positive, we will give you a call.    If you start to have chest pain, difficulty breathing, fever, passout, please come back to the emergency department.

## 2024-09-07 ENCOUNTER — HEALTH MAINTENANCE LETTER (OUTPATIENT)
Age: 64
End: 2024-09-07

## 2025-08-22 ENCOUNTER — HOSPITAL ENCOUNTER (EMERGENCY)
Facility: CLINIC | Age: 65
Discharge: LEFT BEFORE DISCHARGE | End: 2025-08-22
Attending: PHYSICIAN ASSISTANT | Admitting: PHYSICIAN ASSISTANT
Payer: MEDICARE

## 2025-08-22 ENCOUNTER — HOSPITAL ENCOUNTER (EMERGENCY)
Facility: CLINIC | Age: 65
Discharge: HOME | End: 2025-08-22
Payer: MEDICARE

## 2025-08-22 VITALS
DIASTOLIC BLOOD PRESSURE: 73 MMHG | OXYGEN SATURATION: 100 % | TEMPERATURE: 96.6 F | HEART RATE: 84 BPM | RESPIRATION RATE: 18 BRPM | SYSTOLIC BLOOD PRESSURE: 105 MMHG | WEIGHT: 170 LBS | BODY MASS INDEX: 23.03 KG/M2 | HEIGHT: 72 IN

## 2025-08-22 VITALS
OXYGEN SATURATION: 96 % | HEIGHT: 72 IN | TEMPERATURE: 96.7 F | BODY MASS INDEX: 23.03 KG/M2 | HEART RATE: 77 BPM | SYSTOLIC BLOOD PRESSURE: 125 MMHG | RESPIRATION RATE: 18 BRPM | WEIGHT: 170 LBS | DIASTOLIC BLOOD PRESSURE: 74 MMHG

## 2025-08-22 PROCEDURE — 99281 EMR DPT VST MAYX REQ PHY/QHP: CPT | Performed by: PHYSICIAN ASSISTANT

## 2025-08-22 PROCEDURE — 99281 EMR DPT VST MAYX REQ PHY/QHP: CPT

## 2025-08-22 ASSESSMENT — COLUMBIA-SUICIDE SEVERITY RATING SCALE - C-SSRS

## 2025-08-22 ASSESSMENT — ACTIVITIES OF DAILY LIVING (ADL)
ADLS_ACUITY_SCORE: 41